# Patient Record
Sex: MALE | Race: WHITE | HISPANIC OR LATINO | URBAN - METROPOLITAN AREA
[De-identification: names, ages, dates, MRNs, and addresses within clinical notes are randomized per-mention and may not be internally consistent; named-entity substitution may affect disease eponyms.]

---

## 2022-09-09 ENCOUNTER — OFFICE VISIT (OUTPATIENT)
Dept: SURGERY | Facility: CLINIC | Age: 52
End: 2022-09-09

## 2022-09-09 VITALS
HEART RATE: 72 BPM | RESPIRATION RATE: 16 BRPM | WEIGHT: 170 LBS | BODY MASS INDEX: 31.28 KG/M2 | SYSTOLIC BLOOD PRESSURE: 142 MMHG | DIASTOLIC BLOOD PRESSURE: 84 MMHG | HEIGHT: 62 IN

## 2022-09-09 DIAGNOSIS — Z98.890 STATUS POST INCISION AND DRAINAGE: Primary | ICD-10-CM

## 2022-09-09 DIAGNOSIS — Z51.89 VISIT FOR WOUND CHECK: ICD-10-CM

## 2022-09-09 PROCEDURE — 99203 OFFICE O/P NEW LOW 30 MIN: CPT | Performed by: SURGERY

## 2022-09-09 NOTE — PROGRESS NOTES
Jimmy Birch 52 y.o. male presents @ the req of [facility] for eval of abscess LEFT breast.  Pt states he works in [employer] and he felt a stick and then started itching.  He remembers scratching it. Pt states the area increased in size quickly and became very painful.  He went to  and had I&D performed.  Pt states the abscess has decreased significantly and he feels much better.  He is still taking antibiotics as directed per .  Chief Complaint   Patient presents with   • Abscess             HPI     Above-noted agree.  This very pleasant 50-year-old male had incision and drainage at the urgent care on September 4.  He tolerated procedure well.  He followed back up with them and they remove the packing on September 6.  He is here for a wound check.  He says the area is much better.  He believes he was bit while working in [employer].  Today he feels good.  He has no pain.  He has no chest pain or shortness of breath.  He has no fevers or chills.  He is diabetic.  He does not smoke or use tobacco products.  He has no other complaints.    Review of Systems   All other systems reviewed and are negative.            Current Outpatient Medications:   •  atorvastatin (LIPITOR) 80 MG tablet, Take 80 mg by mouth Daily., Disp: , Rfl:   •  insulin detemir (Levemir FlexTouch) 100 UNIT/ML injection, Inject 30 Units under the skin into the appropriate area as directed Every Night., Disp: , Rfl:   •  metFORMIN (GLUCOPHAGE) 500 MG tablet, Take 500 mg by mouth Every 12 (Twelve) Hours., Disp: , Rfl:   •  valsartan (DIOVAN) 320 MG tablet, Take 320 mg by mouth Daily., Disp: , Rfl:         Allergies   Allergen Reactions   • Oxycodone-Acetaminophen GI Intolerance           Past Medical History:   Diagnosis Date   • Diabetes mellitus (HCC)    • Hyperlipidemia    • Hypertension            No past surgical history on file.        Social History     Tobacco Use   • Smoking status: Never Smoker   • Smokeless tobacco: Never Used  "  Vaping Use   • Vaping Use: Never used   Substance Use Topics   • Alcohol use: Not Currently   • Drug use: Never             There is no immunization history on file for this patient.        Physical Exam  Vitals and nursing note reviewed.   Constitutional:       Appearance: Normal appearance.   HENT:      Head: Normocephalic and atraumatic.   Musculoskeletal:         General: No swelling or tenderness.   Skin:     Comments: Small area with slight erythema no fluctuance noted   Neurological:      General: No focal deficit present.      Mental Status: He is alert and oriented to person, place, and time.   Psychiatric:         Mood and Affect: Mood normal.         Behavior: Behavior normal.         Debilities/Disabilities Identified: None    Emotional Behavior: Appropriate      /84   Pulse 72   Resp 16   Ht 157.5 cm (62\")   Wt 77.1 kg (170 lb)   BMI 31.09 kg/m²         Diagnoses and all orders for this visit:    1. Status post incision and drainage (Primary)    2. Visit for wound check    We discussed cleaning the area every evening.  We discussed if the area gets worse to call immediately.    Thank you for allowing me to participate in the care of this interesting patient.        "